# Patient Record
Sex: FEMALE | Race: WHITE | NOT HISPANIC OR LATINO | Employment: OTHER | URBAN - METROPOLITAN AREA
[De-identification: names, ages, dates, MRNs, and addresses within clinical notes are randomized per-mention and may not be internally consistent; named-entity substitution may affect disease eponyms.]

---

## 2018-10-05 ENCOUNTER — HOSPITAL ENCOUNTER (EMERGENCY)
Facility: HOSPITAL | Age: 58
Discharge: HOME | End: 2018-10-05
Attending: EMERGENCY MEDICINE | Admitting: EMERGENCY MEDICINE

## 2018-10-05 ENCOUNTER — HOSPITAL ENCOUNTER (INPATIENT)
Facility: HOSPITAL | Age: 58
LOS: 2 days | Discharge: HOME | End: 2018-10-07
Attending: SPECIALIST | Admitting: SPECIALIST

## 2018-10-05 VITALS
RESPIRATION RATE: 18 BRPM | SYSTOLIC BLOOD PRESSURE: 178 MMHG | BODY MASS INDEX: 19.48 KG/M2 | WEIGHT: 99.2 LBS | TEMPERATURE: 98.7 F | HEIGHT: 60 IN | HEART RATE: 120 BPM | DIASTOLIC BLOOD PRESSURE: 95 MMHG | OXYGEN SATURATION: 93 %

## 2018-10-05 DIAGNOSIS — R45.851 DEPRESSION WITH SUICIDAL IDEATION: Primary | ICD-10-CM

## 2018-10-05 DIAGNOSIS — F32.A DEPRESSION WITH SUICIDAL IDEATION: Primary | ICD-10-CM

## 2018-10-05 PROBLEM — J44.9 COPD (CHRONIC OBSTRUCTIVE PULMONARY DISEASE) (HCC): Status: ACTIVE | Noted: 2018-10-05

## 2018-10-05 PROBLEM — Z72.0 TOBACCO USE: Status: ACTIVE | Noted: 2018-10-05

## 2018-10-05 PROBLEM — F33.2 MAJOR DEPRESSIVE DISORDER, RECURRENT SEVERE WITHOUT PSYCHOTIC FEATURES (HCC): Status: ACTIVE | Noted: 2018-10-05

## 2018-10-05 LAB
ALBUMIN SERPL-MCNC: 5.2 G/DL (ref 3.5–5.2)
ALBUMIN/GLOB SERPL: 1.4 G/DL
ALP SERPL-CCNC: 136 U/L (ref 39–117)
ALT SERPL W P-5'-P-CCNC: 19 U/L (ref 1–33)
AMPHET+METHAMPHET UR QL: NEGATIVE
ANION GAP SERPL CALCULATED.3IONS-SCNC: 19 MMOL/L
AST SERPL-CCNC: 32 U/L (ref 1–32)
BACTERIA UR QL AUTO: ABNORMAL /HPF
BARBITURATES UR QL SCN: NEGATIVE
BASOPHILS # BLD AUTO: 0.12 10*3/MM3 (ref 0–0.2)
BASOPHILS NFR BLD AUTO: 1 % (ref 0–1.5)
BENZODIAZ UR QL SCN: POSITIVE
BILIRUB SERPL-MCNC: 0.3 MG/DL (ref 0.1–1.2)
BILIRUB UR QL STRIP: NEGATIVE
BUN BLD-MCNC: 9 MG/DL (ref 8–23)
BUN/CREAT SERPL: 12 (ref 7–25)
CALCIUM SPEC-SCNC: 10.1 MG/DL (ref 8.6–10.5)
CANNABINOIDS SERPL QL: NEGATIVE
CHLORIDE SERPL-SCNC: 95 MMOL/L (ref 98–107)
CHOLEST SERPL-MCNC: 255 MG/DL (ref 0–200)
CLARITY UR: CLEAR
CO2 SERPL-SCNC: 26 MMOL/L (ref 22–29)
COCAINE UR QL: NEGATIVE
COLOR UR: YELLOW
CREAT BLD-MCNC: 0.75 MG/DL (ref 0.57–1)
DEPRECATED RDW RBC AUTO: 58.6 FL (ref 37–54)
EOSINOPHIL # BLD AUTO: 0.03 10*3/MM3 (ref 0–0.7)
EOSINOPHIL NFR BLD AUTO: 0.2 % (ref 0.3–6.2)
ERYTHROCYTE [DISTWIDTH] IN BLOOD BY AUTOMATED COUNT: 17.8 % (ref 11.7–13)
ETHANOL BLD-MCNC: <10 MG/DL (ref 0–10)
ETHANOL UR QL: <0.01 %
GFR SERPL CREATININE-BSD FRML MDRD: 79 ML/MIN/1.73
GLOBULIN UR ELPH-MCNC: 3.7 GM/DL
GLUCOSE BLD-MCNC: 99 MG/DL (ref 65–99)
GLUCOSE UR STRIP-MCNC: NEGATIVE MG/DL
HCT VFR BLD AUTO: 47.8 % (ref 35.6–45.5)
HDLC SERPL-MCNC: 148 MG/DL (ref 40–60)
HGB BLD-MCNC: 15.2 G/DL (ref 11.9–15.5)
HGB UR QL STRIP.AUTO: NEGATIVE
HYALINE CASTS UR QL AUTO: ABNORMAL /LPF
IMM GRANULOCYTES # BLD: 0.02 10*3/MM3 (ref 0–0.03)
IMM GRANULOCYTES NFR BLD: 0.2 % (ref 0–0.5)
KETONES UR QL STRIP: ABNORMAL
LDLC SERPL CALC-MCNC: 93 MG/DL (ref 0–100)
LDLC/HDLC SERPL: 0.63 {RATIO}
LEUKOCYTE ESTERASE UR QL STRIP.AUTO: ABNORMAL
LYMPHOCYTES # BLD AUTO: 1.59 10*3/MM3 (ref 0.9–4.8)
LYMPHOCYTES NFR BLD AUTO: 12.8 % (ref 19.6–45.3)
MCH RBC QN AUTO: 28.8 PG (ref 26.9–32)
MCHC RBC AUTO-ENTMCNC: 31.8 G/DL (ref 32.4–36.3)
MCV RBC AUTO: 90.7 FL (ref 80.5–98.2)
METHADONE UR QL SCN: NEGATIVE
MONOCYTES # BLD AUTO: 1.41 10*3/MM3 (ref 0.2–1.2)
MONOCYTES NFR BLD AUTO: 11.4 % (ref 5–12)
NEUTROPHILS # BLD AUTO: 9.23 10*3/MM3 (ref 1.9–8.1)
NEUTROPHILS NFR BLD AUTO: 74.4 % (ref 42.7–76)
NITRITE UR QL STRIP: NEGATIVE
NRBC BLD MANUAL-RTO: 0 /100 WBC (ref 0–0)
OPIATES UR QL: NEGATIVE
OXYCODONE UR QL SCN: NEGATIVE
PH UR STRIP.AUTO: 7 [PH] (ref 5–8)
PLATELET # BLD AUTO: 418 10*3/MM3 (ref 140–500)
PMV BLD AUTO: 10 FL (ref 6–12)
POTASSIUM BLD-SCNC: 4.1 MMOL/L (ref 3.5–5.2)
PROT SERPL-MCNC: 8.9 G/DL (ref 6–8.5)
PROT UR QL STRIP: NEGATIVE
RBC # BLD AUTO: 5.27 10*6/MM3 (ref 3.9–5.2)
RBC # UR: ABNORMAL /HPF
REF LAB TEST METHOD: ABNORMAL
SODIUM BLD-SCNC: 140 MMOL/L (ref 136–145)
SP GR UR STRIP: 1.02 (ref 1–1.03)
SQUAMOUS #/AREA URNS HPF: ABNORMAL /HPF
T4 FREE SERPL-MCNC: 1.16 NG/DL (ref 0.93–1.7)
TRIGL SERPL-MCNC: 69 MG/DL (ref 0–150)
TSH SERPL DL<=0.05 MIU/L-ACNC: 1.67 MIU/ML (ref 0.27–4.2)
UROBILINOGEN UR QL STRIP: ABNORMAL
VLDLC SERPL-MCNC: 13.8 MG/DL (ref 5–40)
WBC NRBC COR # BLD: 12.4 10*3/MM3 (ref 4.5–10.7)
WBC UR QL AUTO: ABNORMAL /HPF

## 2018-10-05 PROCEDURE — 80307 DRUG TEST PRSMV CHEM ANLYZR: CPT | Performed by: NURSE PRACTITIONER

## 2018-10-05 PROCEDURE — 94664 DEMO&/EVAL PT USE INHALER: CPT

## 2018-10-05 PROCEDURE — 94640 AIRWAY INHALATION TREATMENT: CPT

## 2018-10-05 PROCEDURE — 84439 ASSAY OF FREE THYROXINE: CPT | Performed by: SPECIALIST

## 2018-10-05 PROCEDURE — 80053 COMPREHEN METABOLIC PANEL: CPT | Performed by: SPECIALIST

## 2018-10-05 PROCEDURE — 36415 COLL VENOUS BLD VENIPUNCTURE: CPT

## 2018-10-05 PROCEDURE — 99284 EMERGENCY DEPT VISIT MOD MDM: CPT

## 2018-10-05 PROCEDURE — 93005 ELECTROCARDIOGRAM TRACING: CPT | Performed by: SPECIALIST

## 2018-10-05 PROCEDURE — 93010 ELECTROCARDIOGRAM REPORT: CPT | Performed by: INTERNAL MEDICINE

## 2018-10-05 PROCEDURE — 81001 URINALYSIS AUTO W/SCOPE: CPT | Performed by: SPECIALIST

## 2018-10-05 PROCEDURE — 94799 UNLISTED PULMONARY SVC/PX: CPT

## 2018-10-05 PROCEDURE — 80061 LIPID PANEL: CPT | Performed by: SPECIALIST

## 2018-10-05 PROCEDURE — 84443 ASSAY THYROID STIM HORMONE: CPT | Performed by: SPECIALIST

## 2018-10-05 PROCEDURE — 85025 COMPLETE CBC W/AUTO DIFF WBC: CPT | Performed by: SPECIALIST

## 2018-10-05 RX ORDER — GABAPENTIN 300 MG/1
600 CAPSULE ORAL 3 TIMES DAILY
Status: DISCONTINUED | OUTPATIENT
Start: 2018-10-05 | End: 2018-10-07 | Stop reason: HOSPADM

## 2018-10-05 RX ORDER — DOXEPIN HYDROCHLORIDE 10 MG/1
10 CAPSULE ORAL NIGHTLY
COMMUNITY

## 2018-10-05 RX ORDER — TIZANIDINE 4 MG/1
4 TABLET ORAL EVERY 6 HOURS PRN
Status: DISCONTINUED | OUTPATIENT
Start: 2018-10-05 | End: 2018-10-07 | Stop reason: HOSPADM

## 2018-10-05 RX ORDER — FLUOXETINE HYDROCHLORIDE 40 MG/1
40 CAPSULE ORAL DAILY
COMMUNITY

## 2018-10-05 RX ORDER — DOXEPIN HYDROCHLORIDE 10 MG/1
10 CAPSULE ORAL NIGHTLY
Status: DISCONTINUED | OUTPATIENT
Start: 2018-10-05 | End: 2018-10-06

## 2018-10-05 RX ORDER — MONTELUKAST SODIUM 4 MG/1
1 TABLET, CHEWABLE ORAL 2 TIMES DAILY
Status: DISCONTINUED | OUTPATIENT
Start: 2018-10-05 | End: 2018-10-07 | Stop reason: HOSPADM

## 2018-10-05 RX ORDER — IPRATROPIUM BROMIDE AND ALBUTEROL SULFATE 2.5; .5 MG/3ML; MG/3ML
3 SOLUTION RESPIRATORY (INHALATION)
Status: DISCONTINUED | OUTPATIENT
Start: 2018-10-05 | End: 2018-10-07 | Stop reason: HOSPADM

## 2018-10-05 RX ORDER — IPRATROPIUM BROMIDE AND ALBUTEROL SULFATE 2.5; .5 MG/3ML; MG/3ML
3 SOLUTION RESPIRATORY (INHALATION) ONCE
Status: COMPLETED | OUTPATIENT
Start: 2018-10-05 | End: 2018-10-05

## 2018-10-05 RX ORDER — TIZANIDINE 4 MG/1
4 TABLET ORAL EVERY 6 HOURS PRN
COMMUNITY

## 2018-10-05 RX ORDER — GABAPENTIN 600 MG/1
600 TABLET ORAL 3 TIMES DAILY
COMMUNITY

## 2018-10-05 RX ORDER — MONTELUKAST SODIUM 4 MG/1
1 TABLET, CHEWABLE ORAL 2 TIMES DAILY
COMMUNITY

## 2018-10-05 RX ORDER — MIRTAZAPINE 15 MG/1
15 TABLET, FILM COATED ORAL NIGHTLY
Status: DISCONTINUED | OUTPATIENT
Start: 2018-10-05 | End: 2018-10-07 | Stop reason: HOSPADM

## 2018-10-05 RX ORDER — ALUMINA, MAGNESIA, AND SIMETHICONE 2400; 2400; 240 MG/30ML; MG/30ML; MG/30ML
15 SUSPENSION ORAL EVERY 6 HOURS PRN
Status: DISCONTINUED | OUTPATIENT
Start: 2018-10-05 | End: 2018-10-07 | Stop reason: HOSPADM

## 2018-10-05 RX ORDER — BUDESONIDE AND FORMOTEROL FUMARATE DIHYDRATE 160; 4.5 UG/1; UG/1
2 AEROSOL RESPIRATORY (INHALATION)
Status: DISCONTINUED | OUTPATIENT
Start: 2018-10-05 | End: 2018-10-07 | Stop reason: HOSPADM

## 2018-10-05 RX ORDER — FOLIC ACID 1 MG/1
1 TABLET ORAL DAILY
Status: DISCONTINUED | OUTPATIENT
Start: 2018-10-05 | End: 2018-10-07 | Stop reason: HOSPADM

## 2018-10-05 RX ORDER — MULTIPLE VITAMINS W/ MINERALS TAB 9MG-400MCG
1 TAB ORAL DAILY
Status: COMPLETED | OUTPATIENT
Start: 2018-10-05 | End: 2018-10-07

## 2018-10-05 RX ORDER — LORAZEPAM 1 MG/1
2 TABLET ORAL
Status: DISCONTINUED | OUTPATIENT
Start: 2018-10-05 | End: 2018-10-07 | Stop reason: HOSPADM

## 2018-10-05 RX ORDER — FLUOXETINE HYDROCHLORIDE 20 MG/1
40 CAPSULE ORAL DAILY
Status: DISCONTINUED | OUTPATIENT
Start: 2018-10-05 | End: 2018-10-07 | Stop reason: HOSPADM

## 2018-10-05 RX ORDER — NICOTINE 21 MG/24HR
1 PATCH, TRANSDERMAL 24 HOURS TRANSDERMAL EVERY 24 HOURS
Status: DISCONTINUED | OUTPATIENT
Start: 2018-10-05 | End: 2018-10-07 | Stop reason: HOSPADM

## 2018-10-05 RX ORDER — ACETAMINOPHEN 325 MG/1
650 TABLET ORAL EVERY 4 HOURS PRN
Status: DISCONTINUED | OUTPATIENT
Start: 2018-10-05 | End: 2018-10-07 | Stop reason: HOSPADM

## 2018-10-05 RX ORDER — MIRTAZAPINE 15 MG/1
15 TABLET, FILM COATED ORAL NIGHTLY
COMMUNITY

## 2018-10-05 RX ORDER — THIAMINE MONONITRATE (VIT B1) 100 MG
200 TABLET ORAL 2 TIMES DAILY
Status: DISCONTINUED | OUTPATIENT
Start: 2018-10-05 | End: 2018-10-07 | Stop reason: HOSPADM

## 2018-10-05 RX ORDER — HYDROXYZINE PAMOATE 25 MG/1
50 CAPSULE ORAL 3 TIMES DAILY PRN
Status: DISCONTINUED | OUTPATIENT
Start: 2018-10-05 | End: 2018-10-07 | Stop reason: HOSPADM

## 2018-10-05 RX ORDER — HYDROXYZINE PAMOATE 50 MG/1
50 CAPSULE ORAL 3 TIMES DAILY PRN
COMMUNITY

## 2018-10-05 RX ADMIN — FLUOXETINE HYDROCHLORIDE 40 MG: 20 CAPSULE ORAL at 15:32

## 2018-10-05 RX ADMIN — IPRATROPIUM BROMIDE AND ALBUTEROL SULFATE 3 ML: .5; 3 SOLUTION RESPIRATORY (INHALATION) at 08:47

## 2018-10-05 RX ADMIN — NICOTINE 1 PATCH: 21 PATCH, EXTENDED RELEASE TRANSDERMAL at 15:33

## 2018-10-05 RX ADMIN — TIZANIDINE 4 MG: 4 TABLET ORAL at 15:32

## 2018-10-05 RX ADMIN — ACETAMINOPHEN 650 MG: 325 TABLET, FILM COATED ORAL at 21:03

## 2018-10-05 RX ADMIN — GABAPENTIN 600 MG: 300 CAPSULE ORAL at 15:32

## 2018-10-05 RX ADMIN — HYDROXYZINE PAMOATE 50 MG: 25 CAPSULE ORAL at 14:41

## 2018-10-05 RX ADMIN — LORAZEPAM 2 MG: 1 TABLET ORAL at 20:35

## 2018-10-05 RX ADMIN — IPRATROPIUM BROMIDE AND ALBUTEROL SULFATE 3 ML: 2.5; .5 SOLUTION RESPIRATORY (INHALATION) at 20:37

## 2018-10-05 RX ADMIN — TIZANIDINE 4 MG: 4 TABLET ORAL at 21:43

## 2018-10-05 RX ADMIN — MULTIPLE VITAMINS W/ MINERALS TAB 1 TABLET: TAB at 15:32

## 2018-10-05 RX ADMIN — GABAPENTIN 600 MG: 300 CAPSULE ORAL at 21:43

## 2018-10-05 RX ADMIN — Medication 200 MG: at 21:43

## 2018-10-05 RX ADMIN — FOLIC ACID 1 MG: 1 TABLET ORAL at 15:32

## 2018-10-05 RX ADMIN — BUDESONIDE AND FORMOTEROL FUMARATE DIHYDRATE 2 PUFF: 160; 4.5 AEROSOL RESPIRATORY (INHALATION) at 20:38

## 2018-10-05 NOTE — NURSING NOTE
Pt arrived on [facility] at 1338, access notified, belongings checked, pt oriented to unit, and VS checked. O2 sats at 86%, pt not in any distress, no use of accessory muscles noted and reports no shortness of air. Pt reported history of COPD. Vistaril given at 1441, will recheck VS shortly.

## 2018-10-05 NOTE — PLAN OF CARE
Problem: Patient Care Overview  Goal: Plan of Care Review  Outcome: Ongoing (interventions implemented as appropriate)   10/05/18 1445 10/05/18 1749   OTHER   Outcome Summary --  Pt arrived on [facility] at 1338, access notified, belongings checked, pt oriented to unit, and VS checked. O2 sats at 86%, pt not in any distress, no use of accessory muscles noted and reports no shortness of air. Pt reported history of COPD. Vistaril given at 1441, effective. O2 rechecked at 1515, 88%, will continue to monitor status. Upon assesment, pt inappropriately focused on D/C, RN explained unit rules and treatment process, pt agreeable. Pt voiced anxiety 10/10 and depression 4/10. Denied SI/HI, A/VH, and pain at this time. Pt contracts for safety. C/O muscle cramps, PRN zanaflex given, effective. Pt has prosthetic right leg, on falls precautions. Pt remains with 1:1 sitter for safety. Placed on 72 hr hold. Pt cooperative with care, attends groups, and med compliant. No further issues reported at this time. Will continue to monitor and provide safe environment.    Plan of Care Review   Progress --  no change   Coping/Psychosocial   Plan of Care Reviewed With patient --    Coping/Psychosocial   Patient Agreement with Plan of Care agrees with comment (describe)  (on 72 hour hold ) --        Problem: Overarching Goals (Adult)  Goal: Adheres to Safety Considerations for Self and Others  Outcome: Ongoing (interventions implemented as appropriate)   10/05/18 1749   Overarching Goals (Adult)   Adheres to Safety Considerations for Self and Others making progress toward outcome     Intervention: Develop and Maintain Individualized Safety Plan   10/05/18 1445 10/05/18 1600   C-SSRS (Recent)   Wish to be Dead no --    Suicidal Thoughts yes  (contracts for safety) --    Suicidal Thought with Method No Plan/Intent no --    Suicidal Intent (without Specific Plan) no --    Suicide Intent with Specific Plan yes --    Describe Plan (Suicide Intent)  yes  (plan to shoot self, Instead shot wall ) --    Suicide Behavior yes --    Describe Actions (Suicidal Behavior) within the last three months --    Violence Risk   Feels Like Hurting Others no --    Previous Attempt to Harm Others no --    Develop and Maintain Individualized Safety Plan   Safety Measures --  safety rounds completed       Goal: Optimized Coping Skills in Response to Life Stressors  Outcome: Ongoing (interventions implemented as appropriate)   10/05/18 1749   Overarching Goals (Adult)   Optimized Coping Skills in Response to Life Stressors making progress toward outcome     Intervention: Promote Effective Coping Strategies   10/05/18 1445   Coping/Psychosocial Interventions   Supportive Measures active listening utilized       Goal: Develops/Participates in Therapeutic [place] to Support Successful Transition  Outcome: Ongoing (interventions implemented as appropriate)   10/05/18 1749   Overarching Goals (Adult)   Develops/Participates in Therapeutic [place] to Support Successful Transition making progress toward outcome     Intervention: Foster Therapeutic [place]   10/05/18 1445   Interventions   Trust Relationship/Rapport care explained;choices provided;thoughts/feelings acknowledged;reassurance provided;questions answered     Intervention: Mutually Develop Transition Plan   10/05/18 1445   Mutually Develop Transition Plan   Transition Support crisis management plan promoted         Problem: Suicidal Behavior (Adult)  Goal: Suicidal Behavior is Absent/Minimized/Managed  Outcome: Ongoing (interventions implemented as appropriate)   10/05/18 1229 10/05/18 1749   OTHER   Action Step/Short Term Goal (STG) Established 10/05/18 --    Suicidal Behavior is Absent/Minimized/Managed   Suicidal Behavior Managed/Minimized Time Frame for Action Step (STG) --  4 days   Suicidal Behavior Managed/Minimized Action Step (STG) Outcome --  making progress toward outcome       Problem: Impaired Control (Excessive  Substance Use) (Adult)  Goal: Participates in Recovery Program (Excessive Substance Use)  Outcome: Ongoing (interventions implemented as appropriate)   10/05/18 1229 10/05/18 1749   Participates in Recovery Program (Excessive Substance Use)   Participates in Recovery Program Action Step/Short Term Goal (STG) Established 10/05/18 --    Participates in Recovery Program Time Frame for Action Step (STG) --  4 days   Participates in Recovery Program Action Step (STG) Outcome --  making progress toward outcome       Problem: Social/Occupational/Functional Impairment (Excessive Substance Use) (Adult)  Goal: Improved Social/Occupational/Functional Skills (Excessive Substance Use)  Outcome: Ongoing (interventions implemented as appropriate)   10/05/18 1229 10/05/18 1749   Improved Social/Occupational/Functional Skills (Excessive Substance Use)   Improved Social/Occupational/Functional Skills Action Step/Short Term Goal (STG) Established 10/05/18 --    Improved Social/Occupational/Functional Skills Time Frame for Action Step (STG) --  4 days   Improved Social/Occupational/Functional Skills Action Step (STG) Outcome --  making progress toward outcome       Problem: Safety Awareness Impairment (Excessive Substance Use) (Adult)  Goal: Enhanced Safety Awareness (Excessive Substance Use)  Outcome: Ongoing (interventions implemented as appropriate)   10/05/18 1229 10/05/18 1749   Enhanced Safety Awareness (Excessive Substance Use)   Enhanced Safety Awareness Action Step/Short Term Goal (STG) Established 10/05/18 --    Enhanced Safety Awareness Time Frame for Action Step (STG) --  4 days   Enhanced Safety Awareness Action Step (STG) Outcome --  making progress toward outcome       Problem: Physiological Impairment (Excessive Substance Use) (Adult)  Goal: Improved Physiologic Symptoms (Excessive Substance Use)  Outcome: Ongoing (interventions implemented as appropriate)   10/05/18 1229 10/05/18 1749   Improved Physiologic Symptoms  (Excessive Substance Use)   Improved Physiologic Symptoms Action Step/Short Term Goal (STG) Established 10/05/18 --    Improved Physiologic Symptoms Time Frame for Action Step (STG) --  4 days   Improved Physiologic Symptoms Action Step (STG) Outcome --  making progress toward outcome
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
## 2018-10-05 NOTE — PLAN OF CARE
Problem: Suicidal Behavior (Adult)  Goal: Suicidal Behavior is Absent/Minimized/Managed  Outcome: Ongoing (interventions implemented as appropriate)   10/05/18 1229   OTHER   Action Step/Short Term Goal (STG) Established 10/05/18   Suicidal Behavior is Absent/Minimized/Managed   Suicidal Behavior Managed/Minimized Time Frame for Action Step (STG) 4 days   Suicidal Behavior Managed/Minimized Action Step (STG) Outcome making progress toward outcome       Problem: Impaired Control (Excessive Substance Use) (Adult)  Goal: Participates in Recovery Program (Excessive Substance Use)  Outcome: Ongoing (interventions implemented as appropriate)   10/05/18 1229   Participates in Recovery Program (Excessive Substance Use)   Participates in Recovery Program Action Step/Short Term Goal (STG) Established 10/05/18   Participates in Recovery Program Time Frame for Action Step (STG) 4 days   Participates in Recovery Program Action Step (STG) Outcome making progress toward outcome       Problem: Social/Occupational/Functional Impairment (Excessive Substance Use) (Adult)  Goal: Improved Social/Occupational/Functional Skills (Excessive Substance Use)  Outcome: Ongoing (interventions implemented as appropriate)   10/05/18 1229   Improved Social/Occupational/Functional Skills (Excessive Substance Use)   Improved Social/Occupational/Functional Skills Action Step/Short Term Goal (STG) Established 10/05/18   Improved Social/Occupational/Functional Skills Time Frame for Action Step (STG) 4 days   Improved Social/Occupational/Functional Skills Action Step (STG) Outcome making progress toward outcome       Problem: Safety Awareness Impairment (Excessive Substance Use) (Adult)  Goal: Enhanced Safety Awareness (Excessive Substance Use)  Outcome: Ongoing (interventions implemented as appropriate)   10/05/18 1229   Enhanced Safety Awareness (Excessive Substance Use)   Enhanced Safety Awareness Action Step/Short Term Goal (STG) Established 10/05/18    Enhanced Safety Awareness Time Frame for Action Step (STG) 4 days   Enhanced Safety Awareness Action Step (STG) Outcome making progress toward outcome       Problem: Physiological Impairment (Excessive Substance Use) (Adult)  Goal: Improved Physiologic Symptoms (Excessive Substance Use)  Outcome: Ongoing (interventions implemented as appropriate)   10/05/18 1229   Improved Physiologic Symptoms (Excessive Substance Use)   Improved Physiologic Symptoms Action Step/Short Term Goal (STG) Established 10/05/18   Improved Physiologic Symptoms Time Frame for Action Step (STG) 4 days   Improved Physiologic Symptoms Action Step (STG) Outcome making progress toward outcome

## 2018-10-05 NOTE — CONSULTS
Irish Mcbride  1960  female  58 y.o.    PCP: Celine Leon MD     Consult requested by: Dr Parag MD    Reason for consult:  COPD and medical management     Date of service: 10/5/2018      HPI:   This is 58 y.o. old female admitted on 10/5/2018 suicidal thoughts and depression.  She lost the job and was drinking and thought of killing herself and send out a text to her friend.  She also has a history of COPD smokes about a pack of cigarettes a day.  She does has some dry cough.  Does not have any fever she feels lot better after coming to the hospital and she says that she is not suicidal anymore      Past Medical History:   Diagnosis Date   • COPD (chronic obstructive pulmonary disease) (CMS/[facility])    • Depression    • Hyperlipidemia      History reviewed. No pertinent surgical history.  Current Facility-Administered Medications on File Prior to Encounter   Medication Dose Route Frequency Provider Last Rate Last Dose   • [COMPLETED] ipratropium-albuterol (DUO-NEB) nebulizer solution 3 mL  3 mL Nebulization Once Jorge Luis Eli MD   3 mL at 10/05/18 0847     Current Outpatient Prescriptions on File Prior to Encounter   Medication Sig Dispense Refill   • Bismuth Subsalicylate (KAOPECTATE) 262 MG tablet Take  by mouth.     • colestipol (COLESTID) 1 g tablet Take 1 g by mouth 2 (Two) Times a Day.     • doxepin (SINEquan) 10 MG capsule Take 10 mg by mouth Every Night.     • FLUoxetine (PROzac) 40 MG capsule Take 40 mg by mouth Daily.     • gabapentin (NEURONTIN) 600 MG tablet Take 600 mg by mouth 3 (Three) Times a Day.     • hydrOXYzine (VISTARIL) 50 MG capsule Take 50 mg by mouth 3 (Three) Times a Day As Needed for Anxiety.     • mirtazapine (REMERON) 15 MG tablet Take 15 mg by mouth Every Night.     • tiZANidine (ZANAFLEX) 4 MG tablet Take 4 mg by mouth Every 6 (Six) Hours As Needed for Muscle Spasms.       Allergies as of 10/05/2018   • (No Known Allergies)     Social History   Substance  "Use Topics   • Smoking status: Current Every Day Smoker     Packs/day: 1.00   • Smokeless tobacco: Not on file   • Alcohol use Yes     History reviewed. No pertinent family history.    colestipol 1 g Oral BID   doxepin 10 mg Oral Nightly   FLUoxetine 40 mg Oral Daily   folic acid 1 mg Oral Daily   gabapentin 600 mg Oral TID   mirtazapine 15 mg Oral Nightly   multivitamin with minerals 1 tablet Oral Daily   nicotine 1 patch Transdermal Q24H   thiamine 200 mg Oral BID       Review of Systems   Constitution: Negative for chills and fever.   HENT: Negative for congestion, nosebleeds and stridor.    Eyes: Negative for blurred vision and discharge.   Cardiovascular: Negative for chest pain, cyanosis and leg swelling.   Respiratory: Positive for wheezing. Negative for cough, hemoptysis, shortness of breath and snoring.    Endocrine: Negative for cold intolerance and heat intolerance.   Skin: Negative for itching and rash.   Musculoskeletal: Negative for falls, neck pain and stiffness.   Gastrointestinal: Negative for diarrhea, heartburn, jaundice and vomiting.   Genitourinary: Negative for dysuria and hematuria.   Neurological: Negative for dizziness, headaches, numbness and seizures.   Psychiatric/Behavioral: Positive for depression. Negative for hallucinations and suicidal ideas (not anymore). The patient does not have insomnia.        /91 (BP Location: Left arm)   Pulse 109   Temp 98.4 °F (36.9 °C) (Oral)   Resp 20   Ht 160 cm (63\")   Wt 43.5 kg (96 lb)   SpO2 (!) 88%   BMI 17.01 kg/m²   No intake/output data recorded.  I/O this shift:  In: 240 [P.O.:240]  Out: -     Physical Exam   Constitutional: She appears cachectic. No distress. Nasal cannula in place.   HENT:   Head: Normocephalic and atraumatic.   Nose: No epistaxis.   Mouth/Throat: Oropharynx is clear and moist.   Eyes: Pupils are equal, round, and reactive to light. Conjunctivae and EOM are normal.   Neck: No JVD present. No tracheal deviation and " no edema present. No thyroid mass and no thyromegaly present.   Cardiovascular: Regular rhythm and normal heart sounds.    No murmur heard.  Pulmonary/Chest: She has decreased breath sounds. She has wheezes.   Chest is barrel shaped   Abdominal: Soft. Normal appearance. She exhibits no ascites. There is no hepatosplenomegaly.   Neurological: She is alert.   Skin: Skin is intact. No cyanosis. Nails show no clubbing.   Psychiatric: Her mood appears anxious.   Vitals reviewed.        Results from last 7 days  Lab Units 10/05/18  1438   WBC 10*3/mm3 12.40*   HEMOGLOBIN g/dL 15.2   HEMATOCRIT % 47.8*   PLATELETS 10*3/mm3 418       Results from last 7 days  Lab Units 10/05/18  0849   SODIUM mmol/L 140   POTASSIUM mmol/L 4.1   CHLORIDE mmol/L 95*   CO2 mmol/L 26.0   BUN mg/dL 9   CREATININE mg/dL 0.75   CALCIUM mg/dL 10.1   BILIRUBIN mg/dL 0.3   ALK PHOS U/L 136*   ALT (SGPT) U/L 19   AST (SGOT) U/L 32   GLUCOSE mg/dL 99                 Active Hospital Problems    Diagnosis Date Noted   • Major depressive disorder, recurrent severe without psychotic features (CMS/HCC) [F33.2] 10/05/2018   • COPD (chronic obstructive pulmonary disease) (CMS/HCC) [J44.9] 10/05/2018   • Tobacco use [Z72.0] 10/05/2018   • Suicidal thoughts [R45.851] 10/05/2018      Resolved Hospital Problems    Diagnosis Date Noted Date Resolved   No resolved problems to display.     Patient is being admitted for suicidal thoughts and depression she is on a watch with a sitter    Patient  has a history of COPD start her on bronchodilators and also she is using a NicoDerm patch                                                        Tarun Robertson MD, [facility]  10/5/2018 ,
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
## 2018-10-06 PROCEDURE — 94799 UNLISTED PULMONARY SVC/PX: CPT

## 2018-10-06 RX ORDER — MIRTAZAPINE 15 MG/1
15 TABLET, FILM COATED ORAL NIGHTLY
Status: DISCONTINUED | OUTPATIENT
Start: 2018-10-06 | End: 2018-10-06 | Stop reason: SDUPTHER

## 2018-10-06 RX ORDER — GABAPENTIN 300 MG/1
600 CAPSULE ORAL 3 TIMES DAILY
Status: DISCONTINUED | OUTPATIENT
Start: 2018-10-06 | End: 2018-10-06 | Stop reason: SDUPTHER

## 2018-10-06 RX ORDER — FLUOXETINE HYDROCHLORIDE 20 MG/1
40 CAPSULE ORAL DAILY
Status: DISCONTINUED | OUTPATIENT
Start: 2018-10-06 | End: 2018-10-06 | Stop reason: SDUPTHER

## 2018-10-06 RX ORDER — HYDROXYZINE PAMOATE 25 MG/1
50 CAPSULE ORAL 3 TIMES DAILY PRN
Status: DISCONTINUED | OUTPATIENT
Start: 2018-10-06 | End: 2018-10-06 | Stop reason: SDUPTHER

## 2018-10-06 RX ORDER — TIZANIDINE 4 MG/1
4 TABLET ORAL EVERY 6 HOURS PRN
Status: DISCONTINUED | OUTPATIENT
Start: 2018-10-06 | End: 2018-10-06 | Stop reason: SDUPTHER

## 2018-10-06 RX ORDER — DOXEPIN HYDROCHLORIDE 10 MG/1
10 CAPSULE ORAL NIGHTLY
Status: DISCONTINUED | OUTPATIENT
Start: 2018-10-06 | End: 2018-10-07 | Stop reason: HOSPADM

## 2018-10-06 RX ORDER — DOXEPIN HYDROCHLORIDE 10 MG/1
10 CAPSULE ORAL NIGHTLY
Status: DISCONTINUED | OUTPATIENT
Start: 2018-10-06 | End: 2018-10-06 | Stop reason: SDUPTHER

## 2018-10-06 RX ADMIN — GABAPENTIN 600 MG: 300 CAPSULE ORAL at 16:14

## 2018-10-06 RX ADMIN — NICOTINE 1 PATCH: 21 PATCH, EXTENDED RELEASE TRANSDERMAL at 16:19

## 2018-10-06 RX ADMIN — Medication 200 MG: at 08:57

## 2018-10-06 RX ADMIN — IPRATROPIUM BROMIDE AND ALBUTEROL SULFATE 3 ML: 2.5; .5 SOLUTION RESPIRATORY (INHALATION) at 20:38

## 2018-10-06 RX ADMIN — FOLIC ACID 1 MG: 1 TABLET ORAL at 08:56

## 2018-10-06 RX ADMIN — LORAZEPAM 2 MG: 1 TABLET ORAL at 11:59

## 2018-10-06 RX ADMIN — BUDESONIDE AND FORMOTEROL FUMARATE DIHYDRATE 2 PUFF: 160; 4.5 AEROSOL RESPIRATORY (INHALATION) at 20:38

## 2018-10-06 RX ADMIN — LORAZEPAM 2 MG: 1 TABLET ORAL at 20:33

## 2018-10-06 RX ADMIN — TIZANIDINE 4 MG: 4 TABLET ORAL at 20:31

## 2018-10-06 RX ADMIN — MIRTAZAPINE 15 MG: 15 TABLET, FILM COATED ORAL at 04:14

## 2018-10-06 RX ADMIN — GABAPENTIN 600 MG: 300 CAPSULE ORAL at 08:57

## 2018-10-06 RX ADMIN — Medication 200 MG: at 20:31

## 2018-10-06 RX ADMIN — TIZANIDINE 4 MG: 4 TABLET ORAL at 12:57

## 2018-10-06 RX ADMIN — MONTELUKAST SODIUM 1 G: 4 TABLET, CHEWABLE ORAL at 14:06

## 2018-10-06 RX ADMIN — GABAPENTIN 600 MG: 300 CAPSULE ORAL at 20:32

## 2018-10-06 RX ADMIN — MULTIPLE VITAMINS W/ MINERALS TAB 1 TABLET: TAB at 08:57

## 2018-10-06 RX ADMIN — MONTELUKAST SODIUM 1 G: 4 TABLET, CHEWABLE ORAL at 20:44

## 2018-10-06 RX ADMIN — LORAZEPAM 2 MG: 1 TABLET ORAL at 16:50

## 2018-10-06 RX ADMIN — FLUOXETINE HYDROCHLORIDE 40 MG: 20 CAPSULE ORAL at 16:13

## 2018-10-06 RX ADMIN — DOXEPIN HYDROCHLORIDE 10 MG: 10 CAPSULE ORAL at 04:35

## 2018-10-06 RX ADMIN — FLUOXETINE HYDROCHLORIDE 40 MG: 20 CAPSULE ORAL at 08:56

## 2018-10-06 RX ADMIN — ACETAMINOPHEN 650 MG: 325 TABLET, FILM COATED ORAL at 08:57

## 2018-10-06 RX ADMIN — BUDESONIDE AND FORMOTEROL FUMARATE DIHYDRATE 2 PUFF: 160; 4.5 AEROSOL RESPIRATORY (INHALATION) at 08:48

## 2018-10-06 RX ADMIN — IPRATROPIUM BROMIDE AND ALBUTEROL SULFATE 3 ML: 2.5; .5 SOLUTION RESPIRATORY (INHALATION) at 08:47

## 2018-10-06 RX ADMIN — LORAZEPAM 2 MG: 1 TABLET ORAL at 14:03

## 2018-10-06 NOTE — PLAN OF CARE
Problem: Patient Care Overview  Goal: Plan of Care Review  Outcome: Ongoing (interventions implemented as appropriate)   10/06/18 0653   OTHER   Outcome Summary PT is cooperative and compliant with medications and treatments. CIWA monitored due to hypertension and pulse. Administered ativan 2 mg per protocol see MAR. PT did not tolerate being off O 2.  O 2 Sat' s returned to sub 90's. PT rated anxiety 6/10, depression 4/10. Will continue to monitor behavior and provide support..   Plan of Care Review   Progress no change   Coping/Psychosocial   Plan of Care Reviewed With patient   Coping/Psychosocial   Patient Agreement with Plan of Care agrees     Goal: Individualization and Mutuality  Outcome: Ongoing (interventions implemented as appropriate)    Goal: Discharge Needs Assessment  Outcome: Ongoing (interventions implemented as appropriate)    Goal: Interprofessional Rounds/Family Conf  Outcome: Ongoing (interventions implemented as appropriate)      Problem: Overarching Goals (Adult)  Goal: Adheres to Safety Considerations for Self and Others  Outcome: Ongoing (interventions implemented as appropriate)    Goal: Optimized Coping Skills in Response to Life Stressors  Outcome: Ongoing (interventions implemented as appropriate)    Goal: Develops/Participates in Therapeutic [place] to Support Successful Transition  Outcome: Ongoing (interventions implemented as appropriate)
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
## 2018-10-06 NOTE — PLAN OF CARE
Problem: Patient Care Overview  Goal: Plan of Care Review  Outcome: Ongoing (interventions implemented as appropriate)   10/06/18 0446   OTHER   Outcome Summary Pt denies current SI. She reports last night was impulsive. Showing poor insight/denial--preoccupied with discharge, but overall cooperative. Rated her level of depression 7/10, anxiety 8/10. Lorazepam PRN given around 2000 for vital sign elevation. Lorazepam noted to sedate pt and affect gait. PEriods of SOA upon waking in milieu. SaO2 fluctuating between 85 & 89% on RA. Started pt on 2L & effective in maintainig sats between 90 & 95%. Pt slept for some time, then requested HS sleep meds that were not given d/t sedation. Pt stated she would prefere to have them, even though it was 0400, because they don't keep her asleep for more than a few hours at a time. PSA remains in place    Plan of Care Review   Progress no change   Coping/Psychosocial   Plan of Care Reviewed With patient   Coping/Psychosocial   Patient Agreement with Plan of Care agrees     Goal: Individualization and Mutuality  Outcome: Ongoing (interventions implemented as appropriate)   10/06/18 0446   Personal Strengths/Vulnerabilities   Patient Personal Strengths self-reliant;resilient;medication/treatment adherence       Problem: Overarching Goals (Adult)  Goal: Adheres to Safety Considerations for Self and Others  Outcome: Ongoing (interventions implemented as appropriate)   10/06/18 0446   Overarching Goals (Adult)   Adheres to Safety Considerations for Self and Others making progress toward outcome     Goal: Optimized Coping Skills in Response to Life Stressors  Outcome: Ongoing (interventions implemented as appropriate)   10/06/18 0446   Overarching Goals (Adult)   Optimized Coping Skills in Response to Life Stressors making progress toward outcome     Goal: Develops/Participates in Therapeutic [place] to Support Successful Transition  Outcome: Ongoing (interventions implemented as  appropriate)   10/06/18 0446   Overarching Goals (Adult)   Develops/Participates in Therapeutic [place] to Support Successful Transition making progress toward outcome       Problem: Suicidal Behavior (Adult)  Goal: Suicidal Behavior is Absent/Minimized/Managed  Outcome: Ongoing (interventions implemented as appropriate)   10/06/18 0446   Suicidal Behavior is Absent/Minimized/Managed   Suicidal Behavior Managed/Minimized Time Frame for Action Step (STG) 3 days   Suicidal Behavior Managed/Minimized Action Step (STG) Outcome making progress toward outcome       Problem: Impaired Control (Excessive Substance Use) (Adult)  Goal: Participates in Recovery Program (Excessive Substance Use)  Outcome: Ongoing (interventions implemented as appropriate)   10/06/18 0446   Participates in Recovery Program (Excessive Substance Use)   Participates in Recovery Program Time Frame for Action Step (STG) 3 days   Participates in Recovery Program Action Step (STG) Outcome making progress toward outcome       Problem: Social/Occupational/Functional Impairment (Excessive Substance Use) (Adult)  Goal: Improved Social/Occupational/Functional Skills (Excessive Substance Use)  Outcome: Ongoing (interventions implemented as appropriate)   10/06/18 0446   Improved Social/Occupational/Functional Skills (Excessive Substance Use)   Improved Social/Occupational/Functional Skills Time Frame for Action Step (STG) 3 days   Improved Social/Occupational/Functional Skills Action Step (STG) Outcome making progress toward outcome       Problem: Safety Awareness Impairment (Excessive Substance Use) (Adult)  Goal: Enhanced Safety Awareness (Excessive Substance Use)  Outcome: Ongoing (interventions implemented as appropriate)   10/06/18 0446   Enhanced Safety Awareness (Excessive Substance Use)   Enhanced Safety Awareness Time Frame for Action Step (STG) 3 days   Enhanced Safety Awareness Action Step (STG) Outcome making progress toward outcome        Problem: Physiological Impairment (Excessive Substance Use) (Adult)  Goal: Improved Physiologic Symptoms (Excessive Substance Use)  Outcome: Ongoing (interventions implemented as appropriate)   10/06/18 0446   Improved Physiologic Symptoms (Excessive Substance Use)   Improved Physiologic Symptoms Time Frame for Action Step (STG) 3 days   Improved Physiologic Symptoms Action Step (STG) Outcome making progress toward outcome

## 2018-10-07 VITALS
SYSTOLIC BLOOD PRESSURE: 157 MMHG | DIASTOLIC BLOOD PRESSURE: 87 MMHG | WEIGHT: 96 LBS | HEART RATE: 107 BPM | RESPIRATION RATE: 18 BRPM | BODY MASS INDEX: 17.01 KG/M2 | TEMPERATURE: 97.4 F | HEIGHT: 63 IN | OXYGEN SATURATION: 96 %

## 2018-10-07 PROCEDURE — 94799 UNLISTED PULMONARY SVC/PX: CPT

## 2018-10-07 RX ORDER — NICOTINE 21 MG/24HR
1 PATCH, TRANSDERMAL 24 HOURS TRANSDERMAL EVERY 24 HOURS
Qty: 7 PATCH | Refills: 0 | Status: SHIPPED | OUTPATIENT
Start: 2018-10-07

## 2018-10-07 RX ADMIN — DOXEPIN HYDROCHLORIDE 10 MG: 10 CAPSULE ORAL at 00:07

## 2018-10-07 RX ADMIN — LORAZEPAM 2 MG: 1 TABLET ORAL at 08:16

## 2018-10-07 RX ADMIN — MIRTAZAPINE 15 MG: 15 TABLET, FILM COATED ORAL at 00:07

## 2018-10-07 RX ADMIN — IPRATROPIUM BROMIDE AND ALBUTEROL SULFATE 3 ML: 2.5; .5 SOLUTION RESPIRATORY (INHALATION) at 09:27

## 2018-10-07 RX ADMIN — Medication 200 MG: at 08:16

## 2018-10-07 RX ADMIN — TIZANIDINE 4 MG: 4 TABLET ORAL at 12:08

## 2018-10-07 RX ADMIN — FOLIC ACID 1 MG: 1 TABLET ORAL at 08:16

## 2018-10-07 RX ADMIN — GABAPENTIN 600 MG: 300 CAPSULE ORAL at 08:16

## 2018-10-07 RX ADMIN — BUDESONIDE AND FORMOTEROL FUMARATE DIHYDRATE 2 PUFF: 160; 4.5 AEROSOL RESPIRATORY (INHALATION) at 09:28

## 2018-10-07 RX ADMIN — MULTIPLE VITAMINS W/ MINERALS TAB 1 TABLET: TAB at 08:16

## 2018-10-07 RX ADMIN — FLUOXETINE HYDROCHLORIDE 40 MG: 20 CAPSULE ORAL at 08:16

## 2018-10-07 RX ADMIN — MONTELUKAST SODIUM 1 G: 4 TABLET, CHEWABLE ORAL at 08:15

## 2018-10-07 NOTE — DISCHARGE SUMMARY
DATES OF ADMISSION: 10/5/2018-10/7/2018    REASON FOR ADMISSION: The patient is a 58-year-old white female brought to this facility after she had made suicidal threats while intoxicated    LABS:  See chart    HOSPITAL COURSE:  The patient was admitted to the crisis management unit and placed on suicide precautions.  These were reduced to low risk after the patient was able to promise safety in the hospital.  Routine detoxication protocol for alcohol was initiated but the patient exhibited little in the way or signs or symptoms of alcohol withdrawal.  The patient was generally pleasant cooperative with care and was compliant with rodriguez routine.  By 10 7 the patient continued to deny suicidal ideation.  It was the feeling of this physician that the patient probably made threats following intoxicated and as per her request patient was discharged, it not being the feeling of this physician that she met criteria for involuntary hospitalization.      FINAL DIAGNOSIS:  Dysthymic disorder, alcohol use disorder, status post amputation, COPD    DISPOSITION ON DISCHARGE:  A full listing of the patient's medications is provided below.  Follow-up will take place to the auspices of community mental health resources as well as chemical dependence resources.    PROGNOSIS: Fair       Discharge Medications      New Medications      Instructions Start Date   nicotine 21 MG/24HR patch  Commonly known as:  NICODERM CQ   1 patch, Transdermal, Every 24 Hours         Continue These Medications      Instructions Start Date   colestipol 1 g tablet  Commonly known as:  COLESTID   1 g, Oral, 2 Times Daily      doxepin 10 MG capsule  Commonly known as:  SINEquan   10 mg, Oral, Nightly      FLUoxetine 40 MG capsule  Commonly known as:  PROzac   40 mg, Oral, Daily      gabapentin 600 MG tablet  Commonly known as:  NEURONTIN   600 mg, Oral, 3 Times Daily      hydrOXYzine 50 MG capsule  Commonly known as:  VISTARIL   50 mg, Oral, 3 Times Daily PRN       KAOPECTATE 262 MG tablet  Generic drug:  Bismuth Subsalicylate   Oral      mirtazapine 15 MG tablet  Commonly known as:  REMERON   15 mg, Oral, Nightly      tiZANidine 4 MG tablet  Commonly known as:  ZANAFLEX   4 mg, Oral, Every 6 Hours PRN

## 2018-10-07 NOTE — PLAN OF CARE
Problem: Patient Care Overview  Goal: Plan of Care Review  Outcome: Ongoing (interventions implemented as appropriate)   10/07/18 0459   OTHER   Outcome Summary Pt is cooperative and complaint with medications; Stressed the importance of wearing her oxygen at all times; CIWA monitored due to HTN and elevated pulse; PO ativan given x1; Will continue to monitor   Plan of Care Review   Progress no change   Coping/Psychosocial   Plan of Care Reviewed With patient   Coping/Psychosocial   Patient Agreement with Plan of Care agrees       Problem: Suicidal Behavior (Adult)  Goal: Suicidal Behavior is Absent/Minimized/Managed  Outcome: Ongoing (interventions implemented as appropriate)

## 2018-10-07 NOTE — PLAN OF CARE
Problem: Patient Care Overview  Goal: Plan of Care Review  Outcome: Outcome(s) achieved Date Met: 10/07/18   10/07/18 4872   OTHER   Outcome Summary PT is compliant with medications but, reluctant to wear O 2. Attending groups. PT verbalized  having history of tachycardia, BP within CIWA protocol . CIWA scored under 4. Administered ativan 2 mg once this shift. Processing for D/c this shift. Will continue to monitor .    Plan of Care Review   Progress no change   Coping/Psychosocial   Plan of Care Reviewed With patient   Coping/Psychosocial   Patient Agreement with Plan of Care agrees     Goal: Individualization and Mutuality  Outcome: Outcome(s) achieved Date Met: 10/07/18    Goal: Discharge Needs Assessment  Outcome: Ongoing (interventions implemented as appropriate)    Goal: Interprofessional Rounds/Family Conf  Outcome: Outcome(s) achieved Date Met: 10/07/18      Problem: Overarching Goals (Adult)  Goal: Adheres to Safety Considerations for Self and Others  Outcome: Outcome(s) achieved Date Met: 10/07/18    Goal: Optimized Coping Skills in Response to Life Stressors  Outcome: Ongoing (interventions implemented as appropriate)    Goal: Develops/Participates in Therapeutic [place] to Support Successful Transition  Outcome: Ongoing (interventions implemented as appropriate)

## 2018-10-08 NOTE — PROGRESS NOTES
Continued Stay Note  [facility]     Patient Name: Irish Mcbride  MRN: [id number]  Today's Date: 10/8/2018    Admit Date: 10/5/2018          Discharge Plan     Row Name 10/08/18 1403       Plan    Final Discharge Disposition Code 01 - home or self-care    Final Note Pt was discharged over the weekend on 10/07 and returned home, per Dr. Tuttle's orders. Pt agreed to follow-up at [facility]'s [place] office [phone] for therapy and medication management. SW awaits a response from [facility] counselor regarding initial appointment for outpatient services. SW will contact pt directly regarding appointment time and date. Pt will address CONNIE and mental health issues simultaneously.   **SW received callback from [facility] counselor, who stated that the soonest appointment for an initial evaluation is on 10/31 at 9:30a with therapist, Jayne San. SW communicated this appointment to the pt, via phone and she agrees with this plan. SW also emailed weekly AA meeting directory in [place], [place] (near her home) to pt's email address.              Discharge Codes    No documentation.       Expected Discharge Date and Time     Expected Discharge Date Expected Discharge Time    Oct 7, 2018             Lisandra Maldonado LCSW

## 2018-10-08 NOTE — PLAN OF CARE
Problem: Patient Care Overview  Goal: Discharge Needs Assessment  Outcome: Outcome(s) achieved Date Met: 10/08/18   10/08/18 1352   Discharge Needs Assessment   Readmission Within the Last 30 Days no previous admission in last 30 days   Concerns to be Addressed medication;mental health;relationship;suicidal;substance/tobacco abuse/use;coping/stress;decision making;compliance issue;financial/insurance   Patient/Family Anticipates Transition to home   Patient/Family Anticipated Services at Transition mental health services   Transportation Anticipated car, drives self;family or friend will provide   Current Discharge Risk substance use/abuse;lives alone;financial support inadequate   Discharge Coordination/Progress Pt was discharged over the weekend on 10/07.  will secure outpatient follow-up for mental health and CONNIE.        Problem: Suicidal Behavior (Adult)  Goal: Suicidal Behavior is Absent/Minimized/Managed  Outcome: Outcome(s) achieved Date Met: 10/08/18      Problem: Social/Occupational/Functional Impairment (Excessive Substance Use) (Adult)  Goal: Improved Social/Occupational/Functional Skills (Excessive Substance Use)  Outcome: Outcome(s) achieved Date Met: 10/08/18      Problem: Safety Awareness Impairment (Excessive Substance Use) (Adult)  Goal: Enhanced Safety Awareness (Excessive Substance Use)  Outcome: Outcome(s) achieved Date Met: 10/08/18      Problem: Physiological Impairment (Excessive Substance Use) (Adult)  Goal: Improved Physiologic Symptoms (Excessive Substance Use)  Outcome: Outcome(s) achieved Date Met: 10/08/18         (1) assistive equipment

## 2018-10-08 NOTE — CONSULTS
Request for CONNIE consult request put on phone line Todd 10-7-18. This worker returned to work on 10-8-18 Monday and pt had been d/c. Therefore, unable to do CONNIE consult.

## 2021-03-16 ENCOUNTER — BULK ORDERING (OUTPATIENT)
Dept: CASE MANAGEMENT | Facility: OTHER | Age: 61
End: 2021-03-16

## 2021-03-16 DIAGNOSIS — Z23 IMMUNIZATION DUE: ICD-10-CM
